# Patient Record
Sex: MALE | Race: ASIAN | URBAN - METROPOLITAN AREA
[De-identification: names, ages, dates, MRNs, and addresses within clinical notes are randomized per-mention and may not be internally consistent; named-entity substitution may affect disease eponyms.]

---

## 2022-03-04 ENCOUNTER — HOSPITAL ENCOUNTER (EMERGENCY)
Age: 42
Discharge: HOME | End: 2022-03-05
Attending: EMERGENCY MEDICINE
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
## 2022-03-04 DIAGNOSIS — E87.6 HYPOKALEMIA: ICD-10-CM

## 2022-03-04 DIAGNOSIS — F10.920 ACUTE ALCOHOLIC INTOXICATION WITHOUT COMPLICATION (HCC): ICD-10-CM

## 2022-03-04 PROCEDURE — 99285 EMERGENCY DEPT VISIT HI MDM: CPT

## 2022-03-04 PROCEDURE — 96372 THER/PROPH/DIAG INJ SC/IM: CPT

## 2022-03-04 PROCEDURE — 96374 THER/PROPH/DIAG INJ IV PUSH: CPT

## 2022-03-04 PROCEDURE — 96375 TX/PRO/DX INJ NEW DRUG ADDON: CPT

## 2022-03-05 ENCOUNTER — APPOINTMENT (OUTPATIENT)
Dept: CT IMAGING | Age: 42
End: 2022-03-05

## 2022-03-05 VITALS
RESPIRATION RATE: 14 BRPM | HEIGHT: 66 IN | TEMPERATURE: 98.7 F | WEIGHT: 160 LBS | HEART RATE: 98 BPM | OXYGEN SATURATION: 99 % | BODY MASS INDEX: 25.71 KG/M2 | SYSTOLIC BLOOD PRESSURE: 100 MMHG | DIASTOLIC BLOOD PRESSURE: 80 MMHG

## 2022-03-05 LAB
ACETAMINOPHEN LEVEL: <5 MCG/ML (ref 10–30)
ANION GAP SERPL CALCULATED.3IONS-SCNC: 15 MMOL/L (ref 7–16)
BASOPHILS ABSOLUTE: 0.04 E9/L (ref 0–0.2)
BASOPHILS RELATIVE PERCENT: 0.6 % (ref 0–2)
BUN BLDV-MCNC: 15 MG/DL (ref 6–20)
CALCIUM SERPL-MCNC: 8.2 MG/DL (ref 8.6–10.2)
CHLORIDE BLD-SCNC: 102 MMOL/L (ref 98–107)
CO2: 21 MMOL/L (ref 22–29)
CREAT SERPL-MCNC: 0.8 MG/DL (ref 0.7–1.2)
EOSINOPHILS ABSOLUTE: 0.08 E9/L (ref 0.05–0.5)
EOSINOPHILS RELATIVE PERCENT: 1.2 % (ref 0–6)
ETHANOL: 192 MG/DL (ref 0–0.08)
ETHANOL: 233 MG/DL (ref 0–0.08)
ETHANOL: 371 MG/DL (ref 0–0.08)
GFR AFRICAN AMERICAN: >60
GFR NON-AFRICAN AMERICAN: >60 ML/MIN/1.73
GLUCOSE BLD-MCNC: 172 MG/DL (ref 74–99)
HCT VFR BLD CALC: 34.3 % (ref 37–54)
HEMOGLOBIN: 11.7 G/DL (ref 12.5–16.5)
IMMATURE GRANULOCYTES #: 0.04 E9/L
IMMATURE GRANULOCYTES %: 0.6 % (ref 0–5)
LYMPHOCYTES ABSOLUTE: 2.42 E9/L (ref 1.5–4)
LYMPHOCYTES RELATIVE PERCENT: 35.3 % (ref 20–42)
MAGNESIUM: 2.1 MG/DL (ref 1.6–2.6)
MCH RBC QN AUTO: 35.7 PG (ref 26–35)
MCHC RBC AUTO-ENTMCNC: 34.1 % (ref 32–34.5)
MCV RBC AUTO: 104.6 FL (ref 80–99.9)
MONOCYTES ABSOLUTE: 0.88 E9/L (ref 0.1–0.95)
MONOCYTES RELATIVE PERCENT: 12.8 % (ref 2–12)
NEUTROPHILS ABSOLUTE: 3.39 E9/L (ref 1.8–7.3)
NEUTROPHILS RELATIVE PERCENT: 49.5 % (ref 43–80)
PDW BLD-RTO: 12.9 FL (ref 11.5–15)
PLATELET # BLD: 273 E9/L (ref 130–450)
PMV BLD AUTO: 9 FL (ref 7–12)
POTASSIUM REFLEX MAGNESIUM: 2.9 MMOL/L (ref 3.5–5)
RBC # BLD: 3.28 E12/L (ref 3.8–5.8)
SALICYLATE, SERUM: <0.3 MG/DL (ref 0–30)
SODIUM BLD-SCNC: 138 MMOL/L (ref 132–146)
TRICYCLIC ANTIDEPRESSANTS SCREEN SERUM: NEGATIVE NG/ML
WBC # BLD: 6.9 E9/L (ref 4.5–11.5)

## 2022-03-05 PROCEDURE — 6360000002 HC RX W HCPCS: Performed by: EMERGENCY MEDICINE

## 2022-03-05 PROCEDURE — 85025 COMPLETE CBC W/AUTO DIFF WBC: CPT

## 2022-03-05 PROCEDURE — 36415 COLL VENOUS BLD VENIPUNCTURE: CPT

## 2022-03-05 PROCEDURE — 6360000002 HC RX W HCPCS

## 2022-03-05 PROCEDURE — 72125 CT NECK SPINE W/O DYE: CPT

## 2022-03-05 PROCEDURE — 80048 BASIC METABOLIC PNL TOTAL CA: CPT

## 2022-03-05 PROCEDURE — 83735 ASSAY OF MAGNESIUM: CPT

## 2022-03-05 PROCEDURE — 74177 CT ABD & PELVIS W/CONTRAST: CPT

## 2022-03-05 PROCEDURE — 2580000003 HC RX 258: Performed by: EMERGENCY MEDICINE

## 2022-03-05 PROCEDURE — 2500000003 HC RX 250 WO HCPCS: Performed by: EMERGENCY MEDICINE

## 2022-03-05 PROCEDURE — 80143 DRUG ASSAY ACETAMINOPHEN: CPT

## 2022-03-05 PROCEDURE — 80179 DRUG ASSAY SALICYLATE: CPT

## 2022-03-05 PROCEDURE — 71260 CT THORAX DX C+: CPT

## 2022-03-05 PROCEDURE — 82077 ASSAY SPEC XCP UR&BREATH IA: CPT

## 2022-03-05 PROCEDURE — 80307 DRUG TEST PRSMV CHEM ANLYZR: CPT

## 2022-03-05 PROCEDURE — 6360000004 HC RX CONTRAST MEDICATION: Performed by: RADIOLOGY

## 2022-03-05 PROCEDURE — 2580000003 HC RX 258

## 2022-03-05 PROCEDURE — 70450 CT HEAD/BRAIN W/O DYE: CPT

## 2022-03-05 RX ORDER — 0.9 % SODIUM CHLORIDE 0.9 %
1000 INTRAVENOUS SOLUTION INTRAVENOUS ONCE
Status: COMPLETED | OUTPATIENT
Start: 2022-03-05 | End: 2022-03-05

## 2022-03-05 RX ORDER — POTASSIUM CHLORIDE 20 MEQ/1
40 TABLET, EXTENDED RELEASE ORAL ONCE
Status: DISCONTINUED | OUTPATIENT
Start: 2022-03-05 | End: 2022-03-05 | Stop reason: HOSPADM

## 2022-03-05 RX ORDER — LORAZEPAM 2 MG/ML
INJECTION INTRAMUSCULAR
Status: COMPLETED
Start: 2022-03-05 | End: 2022-03-05

## 2022-03-05 RX ORDER — DIPHENHYDRAMINE HYDROCHLORIDE 50 MG/ML
50 INJECTION INTRAMUSCULAR; INTRAVENOUS ONCE
Status: COMPLETED | OUTPATIENT
Start: 2022-03-05 | End: 2022-03-05

## 2022-03-05 RX ORDER — DIPHENHYDRAMINE HYDROCHLORIDE 50 MG/ML
INJECTION INTRAMUSCULAR; INTRAVENOUS
Status: COMPLETED
Start: 2022-03-05 | End: 2022-03-05

## 2022-03-05 RX ORDER — ONDANSETRON 2 MG/ML
INJECTION INTRAMUSCULAR; INTRAVENOUS
Status: COMPLETED
Start: 2022-03-05 | End: 2022-03-05

## 2022-03-05 RX ORDER — ONDANSETRON 2 MG/ML
4 INJECTION INTRAMUSCULAR; INTRAVENOUS ONCE
Status: COMPLETED | OUTPATIENT
Start: 2022-03-05 | End: 2022-03-05

## 2022-03-05 RX ORDER — LORAZEPAM 2 MG/ML
2 INJECTION INTRAMUSCULAR ONCE
Status: COMPLETED | OUTPATIENT
Start: 2022-03-05 | End: 2022-03-05

## 2022-03-05 RX ORDER — HALOPERIDOL 5 MG/ML
INJECTION INTRAMUSCULAR
Status: COMPLETED
Start: 2022-03-05 | End: 2022-03-05

## 2022-03-05 RX ADMIN — DIPHENHYDRAMINE HYDROCHLORIDE 50 MG: 50 INJECTION INTRAMUSCULAR; INTRAVENOUS at 01:03

## 2022-03-05 RX ADMIN — FOLIC ACID: 5 INJECTION, SOLUTION INTRAMUSCULAR; INTRAVENOUS; SUBCUTANEOUS at 08:22

## 2022-03-05 RX ADMIN — SODIUM CHLORIDE 1000 ML: 9 INJECTION, SOLUTION INTRAVENOUS at 04:58

## 2022-03-05 RX ADMIN — DIPHENHYDRAMINE HYDROCHLORIDE 50 MG: 50 INJECTION, SOLUTION INTRAMUSCULAR; INTRAVENOUS at 01:03

## 2022-03-05 RX ADMIN — ONDANSETRON 4 MG: 2 INJECTION INTRAMUSCULAR; INTRAVENOUS at 03:17

## 2022-03-05 RX ADMIN — LORAZEPAM 2 MG: 2 INJECTION INTRAMUSCULAR at 01:03

## 2022-03-05 RX ADMIN — HALOPERIDOL LACTATE 5 MG: 5 INJECTION, SOLUTION INTRAMUSCULAR at 01:04

## 2022-03-05 RX ADMIN — IOPAMIDOL 90 ML: 755 INJECTION, SOLUTION INTRAVENOUS at 02:51

## 2022-03-05 RX ADMIN — LORAZEPAM 2 MG: 2 INJECTION INTRAMUSCULAR; INTRAVENOUS at 01:03

## 2022-03-05 RX ADMIN — ONDANSETRON HYDROCHLORIDE 4 MG: 2 SOLUTION INTRAMUSCULAR; INTRAVENOUS at 03:17

## 2022-03-05 ASSESSMENT — ENCOUNTER SYMPTOMS
RHINORRHEA: 0
CHEST TIGHTNESS: 0
ABDOMINAL PAIN: 0
SHORTNESS OF BREATH: 0
BACK PAIN: 0
NAUSEA: 0
EYE ITCHING: 0
CONSTIPATION: 0
EYE REDNESS: 0
DIARRHEA: 0
WHEEZING: 0
ABDOMINAL DISTENTION: 0
TROUBLE SWALLOWING: 0
VOMITING: 0

## 2022-03-05 NOTE — ED PROVIDER NOTES
Katherine Monroy is a 42 y.o. male    Chief Complaint   Patient presents with    Head Laceration     pt to ED via EMS after bystander called. Pt found with laceration to L-forehead bleeding controlled. Pt c/o L-shoulder pain, headache, +ETOH. Unknown patient fell. HPI   Katherine Monroy is a 42 y.o. male presenting to the ED for Head Laceration (pt to ED via EMS after bystander called. Pt found with laceration to L-forehead bleeding controlled. Pt c/o L-shoulder pain, headache, +ETOH. Unknown patient fell. )    History comes primarily from the patient. Patient is a Mock man who speaks only Mock and an  is used. The presents after apparently falling off of a wall according to EMS. Patient is intoxicated with alcohol. Patient complains of pain in his head, and left shoulder. Patient has an obvious hematoma to his forehead. EMS stated he had a laceration to the back of his head as well but I was not able to find it on my exam.  During the interview with the , the  was unable to understand the patient multiple times possibly due to slurring of his words. Patient is in no acute distress. Symptoms began tonight with severe severity, and is constant. No other modifying factors or associated symptoms. Review of Systems   Constitutional: Negative for appetite change, fatigue and fever. HENT: Negative for congestion, rhinorrhea and trouble swallowing. Eyes: Negative for redness and itching. Respiratory: Negative for chest tightness, shortness of breath and wheezing. Cardiovascular: Negative for chest pain, palpitations and leg swelling. Gastrointestinal: Negative for abdominal distention, abdominal pain, constipation, diarrhea, nausea and vomiting. Genitourinary: Negative for decreased urine volume, difficulty urinating and frequency. Musculoskeletal: Positive for arthralgias (left shoulder). Negative for back pain and myalgias.    Neurological: Behavior is agitated and aggressive. Thought Content: Thought content normal.          Procedures     MDM   Patient presented to the Emergency Department for Head Laceration (pt to ED via EMS after bystander called. Pt found with laceration to L-forehead bleeding controlled. Pt c/o L-shoulder pain, headache, +ETOH. Unknown patient fell. )    They are clinically stable, vital signs stable, non toxic appearing. Vital signs interpreted myself as mildly tachycardic and hypertensive  History and physical examination findings consistent with all intoxication    Labs:  Potassium 2.9  Ethanol 371    Imaging:  No acute abnormalities on CT scan of head, chest, C-spine, abdominal and pelvis. This patient will be most likely discharged  This patient appears to have alcohol intoxication with resulting fall  We did the following: Patient received potassium due to his low potassium. Also given banana bag by oncoming physician. Patient previously received a liter of saline. Due to his agitation, the patient received Haldol, Ativan and diphenhydramine. He also received Zofran for some nausea. Patient will likely be discharged. He is currently sleeping and will be observed until patient is sober enough. [facility]. ED Course as of 03/05/22 0639   Sat Mar 05, 2022   0057 Patient was uncooperative at CT scan received 5 mg of Haldol. He continues to be uncooperative and aggressive pain here in the emergency department. He is given 2 mg Ativan and 50 mg Benadryl [KS]      ED Course User Index  [KS] Wolf Brower MD       --------------------------------------------- PAST HISTORY ---------------------------------------------  Past Medical History:  has no past medical history on file. Past Surgical History:  has no past surgical history on file. Social History:      Family History: family history is not on file.      The patients home medications CERVICAL SPINE WO CONTRAST   Final Result   No acute abnormality of the cervical spine. CT ABDOMEN PELVIS W IV CONTRAST Additional Contrast? None   Final Result   No acute abdominopelvic abnormality. CT CHEST W CONTRAST   Final Result   No acute intrathoracic abnormality.             ------------------------- NURSING NOTES AND VITALS REVIEWED ---------------------------  Date / Time Roomed:  3/4/2022 11:48 PM  ED Bed Assignment:  21/21    The nursing notes within the ED encounter and vital signs as below have been reviewed. BP 92/65   Pulse 85   Temp 98.7 °F (37.1 °C) (Axillary)   Resp 16   Ht 5' 6\" (1.676 m)   Wt 160 lb (72.6 kg)   SpO2 100%   BMI 25.82 kg/m²   Oxygen Saturation Interpretation: Normal      ------------------------------------------ PROGRESS NOTES ------------------------------------------  6:39 AM EST  I have spoken with the patient and discussed todays results, in addition to providing specific details for the plan of care and counseling regarding the diagnosis and prognosis. Their questions are answered at this time and they are agreeable with the plan. I discussed at length with them reasons for immediate return here for re evaluation. They will followup with their primary care physician by calling their office tomorrow. --------------------------------- ADDITIONAL PROVIDER NOTES ---------------------------------  At this time the patient is without objective evidence of an acute process requiring hospitalization or inpatient management. They have remained hemodynamically stable throughout their entire ED visit and are stable for discharge with outpatient follow-up. The plan has been discussed in detail and they are aware of the specific conditions for emergent return, as well as the importance of follow-up. New Prescriptions    No medications on file       Diagnosis:  1. Fall, initial encounter    2.  Acute alcoholic intoxication without complication ([facility] [address]    3. Hypokalemia        Disposition:  Patient's disposition: Discharge to home  Patient's condition is stable. Patient is signed out to the oncoming resident and physician.        Nelly Morales MD  Resident  03/05/22 7330

## 2022-03-05 NOTE — ED NOTES
Report received from [facility]. In with Dr. Maulik Kulkarni to assess pt wound. Pt appearing nauseated.  Verbal order 4mg zofran given     Wolf Escobar RN  03/05/22 [id number]

## 2022-03-05 NOTE — ED NOTES
[facility] transported [facility] with STIVEN Hines RN  03/05/22 6561

## 2022-03-05 NOTE — ED NOTES
Pt unable to remain alert long enough to take potassium PO. Provider aware.       Rafa Concepcion RN  03/05/22 9581

## 2022-03-05 NOTE — ED NOTES
Used  to explain to pt why they were here and to wait another 45min to an hour to sober a little more. MD okay with pts most recent ETOH and leaving without a ride needed. Pt able to ambulate safely and eating and drinking water okay.      Sydney Starks RN  03/05/22 8350

## 2022-03-05 NOTE — ED NOTES
Pt continuously removing oxygen and pulse ox. RN in several times to put nasal cannula and pulse ox back on. Pt removed c-collar.           Juaquin Alejandro RN  03/05/22 4866

## 2022-03-05 NOTE — ED NOTES
Received call from CT stating patient was awake, screaming and not cooperating. RN notified provider. When RN arrived to CT, pt pulled IV out and was violent with staff and trying to punch staff. [facility] arrived to CT and patient was put back onto ER bed to be transported back to room. Patient back in room and trying to stand up off bed, hitting and kicking at staff and yelling. Pt medicated with haldol, ativan and benadryl. Pt now laying in bed calm. Pulse ox on.       Law Oneill RN  03/05/22 1931

## 2022-03-05 NOTE — ED NOTES
Pt arrived by EMS after bystander called 911. Pt was found with a lac to left side of forehead. Pt c/o headache and left side shoulder pain. Pt admits to drinking alcohol and unable to explain what happened. RN and provider in with patient and using  pad.       Nidhi Vaughan RN  03/05/22 4320

## 2022-03-05 NOTE — ED NOTES
Pt awoke for a short time and I had attempted to explain why they are here and what happen. Pt does not remember what happened last night but does admit drinking. Pt then urinated on the floor without asking for the restroom or urinal. Ethanol level drawn and sent. Pt cleaned and now sleeping again. Will continue to monitor.      Amie Padilla RN  03/05/22 7196

## 2022-03-05 NOTE — ED PROVIDER NOTES
Department of Emergency Medicine    ED  Provider Note - Sign out / Oncoming Provider   Admit Date/RoomTime: 3/4/2022 11:48 PM  0600 AM EST      I received this patient at sign out from Dr. Charu Claudio. I have discussed the patient's initial exam, treatment and plan of care with the out going physician. I have introduced my self to the patient / family and have answered their questions to this point. I have examined the patient myself and reviewed ordered tests / medications and  reviewed any available results to this point. If a resident is involved in the Emergency Department care, I have discussed my findings and plan with them as well. MDM:          Patient signed out to me pending continued observation, of alcohol trending down, he is awake alert ambulatory does not appear clinically intoxicated. Repeat ethyl alcohol level was still elevated, patient was encouraged to stay for continued observation. Has eaten, he then eloped from the department    Time: 0900  Re-evaluation. Patients symptoms are improving  Repeat physical examination is significantly improved - awake, alert, ambulatory       Re-evaluation. Patients symptoms are improving  Repeat physical examination is improved       --------------------------------- IMPRESSION AND DISPOSITION ---------------------------------    IMPRESSION  1. Fall, initial encounter    2. Acute alcoholic intoxication without complication ([facility] [address]    3. Hypokalemia    4.  Blood alcohol level of 240 mg/100 ml or more        DISPOSITION  Disposition: Other Disposition: Eloped  Patient condition is stable         Jose Muniz DO  03/05/22 1127